# Patient Record
Sex: FEMALE | Race: WHITE | Employment: FULL TIME | URBAN - METROPOLITAN AREA
[De-identification: names, ages, dates, MRNs, and addresses within clinical notes are randomized per-mention and may not be internally consistent; named-entity substitution may affect disease eponyms.]

---

## 2018-02-05 ENCOUNTER — HOSPITAL ENCOUNTER (EMERGENCY)
Age: 23
Discharge: HOME | End: 2018-02-05
Attending: EMERGENCY MEDICINE
Payer: MEDICAID

## 2018-02-05 VITALS
BODY MASS INDEX: 19.43 KG/M2 | WEIGHT: 116.6 LBS | TEMPERATURE: 98.9 F | OXYGEN SATURATION: 100 % | HEART RATE: 108 BPM | DIASTOLIC BLOOD PRESSURE: 91 MMHG | SYSTOLIC BLOOD PRESSURE: 127 MMHG | RESPIRATION RATE: 16 BRPM | HEIGHT: 65 IN

## 2018-02-05 DIAGNOSIS — N76.2 CELLULITIS OF LABIA: Primary | ICD-10-CM

## 2018-02-05 DIAGNOSIS — R59.9 REACTIVE LYMPHADENOPATHY: ICD-10-CM

## 2018-02-05 LAB
CHP ED QC CHECK: NORMAL
PREGNANCY TEST URINE, POC: NEGATIVE

## 2018-02-05 PROCEDURE — 99283 EMERGENCY DEPT VISIT LOW MDM: CPT

## 2018-02-05 RX ORDER — CLONIDINE HYDROCHLORIDE 0.1 MG/1
0.1 TABLET ORAL NIGHTLY
COMMUNITY

## 2018-02-05 RX ORDER — DOXYCYCLINE HYCLATE 100 MG
100 TABLET ORAL 2 TIMES DAILY
Qty: 20 TABLET | Refills: 0 | Status: SHIPPED | OUTPATIENT
Start: 2018-02-05 | End: 2018-05-16 | Stop reason: ALTCHOICE

## 2018-02-05 RX ORDER — LAMOTRIGINE 200 MG/1
200 TABLET ORAL DAILY
COMMUNITY

## 2018-02-05 RX ORDER — CITALOPRAM 40 MG/1
40 TABLET ORAL DAILY
COMMUNITY

## 2018-02-05 RX ORDER — CEPHALEXIN 500 MG/1
500 CAPSULE ORAL 3 TIMES DAILY
Qty: 21 CAPSULE | Refills: 0 | Status: SHIPPED | OUTPATIENT
Start: 2018-02-05 | End: 2018-05-16 | Stop reason: ALTCHOICE

## 2018-02-05 ASSESSMENT — ENCOUNTER SYMPTOMS
CONSTIPATION: 0
WHEEZING: 0
NAUSEA: 0
VOMITING: 0
ABDOMINAL PAIN: 0
SORE THROAT: 0
COLOR CHANGE: 0
RHINORRHEA: 0
DIARRHEA: 0
COUGH: 0
SINUS PRESSURE: 0
SHORTNESS OF BREATH: 0

## 2018-02-05 ASSESSMENT — PAIN DESCRIPTION - FREQUENCY: FREQUENCY: CONTINUOUS

## 2018-02-05 ASSESSMENT — PAIN SCALES - GENERAL: PAINLEVEL_OUTOF10: 8

## 2018-02-05 ASSESSMENT — PAIN DESCRIPTION - LOCATION: LOCATION: GROIN

## 2018-02-05 ASSESSMENT — PAIN SCALES - WONG BAKER: WONGBAKER_NUMERICALRESPONSE: 8

## 2018-02-05 ASSESSMENT — PAIN DESCRIPTION - ORIENTATION: ORIENTATION: RIGHT;LEFT

## 2018-02-05 ASSESSMENT — PAIN DESCRIPTION - DESCRIPTORS: DESCRIPTORS: STABBING;THROBBING;SHOOTING;SHARP

## 2018-02-05 NOTE — ED NOTES
Swollen lymph nodes both groin past 4 days denies injury no sores/lacerations denies fever or chills.      Porsha Brandon RN  02/05/18 6043

## 2018-02-05 NOTE — ED PROVIDER NOTES
[address] [place] [address] ED  eMERGENCY dEPARTMENT eNCOUnter      Pt Name: Kareem Moscoso  MRN: [id number]  [place] 1995  Date of evaluation: 2/5/2018  Provider: [address] [place] [address] NP, OMARI    CHIEF COMPLAINT       Chief Complaint   Patient presents with    Groin Swelling     bilat. onset 4 days ago, history of overactive lymph nodes         HISTORY OF PRESENT ILLNESS  (Location/Symptom, Timing/Onset, Context/Setting, Quality, Duration, Modifying Factors, Severity.)   Kareem Moscoso is a 25 y.o. female who presents to the emergency department Today by private vehicle for evaluation of swelling to the labia. The patient states that for the last 4 days she had noted some swelling and tenderness to the right labia. She states the last time she had also noted some swollen lymph nodes in both of her groins. She states that she has had lymph nodes removed and swollen in her armpits in the past.  She had been placed on antibiotics at that time and the lymph nodes improved. She denies any associated fevers or chills. She denies any vaginal bleeding or vaginal discharge       Nursing Notes were reviewed. ALLERGIES     Review of patient's allergies indicates no known allergies.     CURRENT MEDICATIONS       Discharge Medication List as of 2/5/2018  2:17 PM      CONTINUE these medications which have NOT CHANGED    Details   ClonazePAM (KLONOPIN PO) Take by mouthHistorical Med      cloNIDine (CATAPRES) 0.1 MG tablet Take 0.1 mg by mouth nightlyHistorical Med      Melatonin-Pyridoxine (MELATONEX PO) Take 5 mg by mouth nightlyHistorical Med      citalopram (CELEXA) 40 MG tablet Take 40 mg by mouth dailyHistorical Med      NONFORMULARY Historical Med      lamoTRIgine (LAMICTAL) 200 MG tablet Take 200 mg by mouth dailyHistorical Med             PAST MEDICAL HISTORY         Diagnosis Date    Depression        SURGICAL HISTORY           Procedure Laterality Date    OVARIAN CYST REMOVAL           FAMILY HISTORY     History Lymphadenopathy:     She has no cervical adenopathy. Neurological: She is alert and oriented to person, place, and time. Skin: Skin is warm and dry. No rash noted. Psychiatric: She has a normal mood and affect. Vitals reviewed. LABS:  Labs Reviewed   POCT URINE PREGNANCY - Normal       All other labs were within normal range or not returned as of this dictation. EMERGENCY DEPARTMENT COURSE and DIFFERENTIAL DIAGNOSIS/MDM:   Vitals:    Vitals:    02/05/18 1315   BP: (!) 127/91   Pulse: 108   Resp: 16   Temp: 98.9 °F (37.2 °C)   TempSrc: Oral   SpO2: 100%   Weight: 116 lb 9.6 oz (52.9 kg)   Height: 5' 5\" (1.651 m)       Medical Decision Making: Patient was placed on doxycycline and Keflex. Follow up with her primary care physician if symptoms do not improve, return for worsening symptoms or concerns    FINAL IMPRESSION      1. Cellulitis of labia    2.  Reactive lymphadenopathy          DISPOSITION/PLAN   DISPOSITION Decision To Discharge 02/05/2018 02:16:13 PM      PATIENT REFERRED TO:   Elnoria Sicard, [address] [id number]    Call in 2 days      [facility] ED  [address] [facility]  [phone]    If symptoms worsen      DISCHARGE MEDICATIONS:     Discharge Medication List as of 2/5/2018  2:17 PM      START taking these medications    Details   doxycycline hyclate (VIBRA-TABS) 100 MG tablet Take 1 tablet by mouth 2 times daily, Disp-20 tablet, R-0Print      cephALEXin (KEFLEX) 500 MG capsule Take 1 capsule by mouth 3 times daily, Disp-21 capsule, R-0Print                 (Please note that portions of this note were completed with a voice recognition program.  Efforts were made to edit the dictations but occasionally words are mis-transcribed.)    Aneta Herring NP, CNP  Certified Nurse Practitioner            Melissa Garcia CNP  02/05/18 5823

## 2018-05-16 ENCOUNTER — OFFICE VISIT (OUTPATIENT)
Dept: FAMILY MEDICINE CLINIC | Age: 23
End: 2018-05-16

## 2018-05-16 VITALS
OXYGEN SATURATION: 98 % | TEMPERATURE: 98.5 F | RESPIRATION RATE: 16 BRPM | DIASTOLIC BLOOD PRESSURE: 80 MMHG | SYSTOLIC BLOOD PRESSURE: 104 MMHG | HEART RATE: 77 BPM

## 2018-05-16 DIAGNOSIS — Z02.1 PHYSICAL EXAM, PRE-EMPLOYMENT: Primary | ICD-10-CM

## 2018-05-16 PROCEDURE — WM9428: Performed by: NURSE PRACTITIONER

## 2018-05-16 ASSESSMENT — ENCOUNTER SYMPTOMS
CHEST TIGHTNESS: 0
WHEEZING: 0
SHORTNESS OF BREATH: 0
CONSTIPATION: 0
DIARRHEA: 0
EYES NEGATIVE: 1
GASTROINTESTINAL NEGATIVE: 1
BACK PAIN: 0
ABDOMINAL PAIN: 0
COUGH: 0

## 2020-08-17 ENCOUNTER — OFFICE VISIT (OUTPATIENT)
Dept: FAMILY MEDICINE CLINIC | Age: 25
End: 2020-08-17
Payer: COMMERCIAL

## 2020-08-17 VITALS
DIASTOLIC BLOOD PRESSURE: 81 MMHG | BODY MASS INDEX: 21.69 KG/M2 | HEART RATE: 97 BPM | WEIGHT: 135 LBS | SYSTOLIC BLOOD PRESSURE: 114 MMHG | OXYGEN SATURATION: 95 % | TEMPERATURE: 98.5 F | HEIGHT: 66 IN

## 2020-08-17 PROCEDURE — 99213 OFFICE O/P EST LOW 20 MIN: CPT | Performed by: NURSE PRACTITIONER

## 2020-08-17 RX ORDER — LAMOTRIGINE 25 MG/1
TABLET ORAL
COMMUNITY
Start: 2020-08-05

## 2020-08-17 RX ORDER — IBUPROFEN 800 MG/1
TABLET ORAL
COMMUNITY
Start: 2020-07-16

## 2020-08-17 RX ORDER — CEPHALEXIN 500 MG/1
500 CAPSULE ORAL 3 TIMES DAILY
Qty: 21 CAPSULE | Refills: 0 | Status: SHIPPED | OUTPATIENT
Start: 2020-08-17 | End: 2020-08-24

## 2020-08-17 ASSESSMENT — PATIENT HEALTH QUESTIONNAIRE - PHQ9
SUM OF ALL RESPONSES TO PHQ QUESTIONS 1-9: 0
2. FEELING DOWN, DEPRESSED OR HOPELESS: 0
1. LITTLE INTEREST OR PLEASURE IN DOING THINGS: 0
SUM OF ALL RESPONSES TO PHQ9 QUESTIONS 1 & 2: 0
SUM OF ALL RESPONSES TO PHQ QUESTIONS 1-9: 0

## 2020-08-17 ASSESSMENT — ENCOUNTER SYMPTOMS
COUGH: 0
VOMITING: 0
ABDOMINAL PAIN: 0
RESPIRATORY NEGATIVE: 1
SHORTNESS OF BREATH: 0
DIARRHEA: 0
CONSTIPATION: 0
NAUSEA: 0
WHEEZING: 0
CHEST TIGHTNESS: 0

## 2020-08-17 NOTE — PROGRESS NOTES
[address]  [address] [place] [address]  Dept: [phone]  Dept Fax: [phone]     Kevin Sharma is a 25 y.o. female who presents to the urgent care today for her medicalconditions/complaints as noted below. Kevin Sharma is c/o of Cyst (under lt arm - noticed it 4 days ago) and Menorrhagia (has been bleeding for the past 4 days after period stopped and felt a pop)    HPI:      Rash   This is a new problem. Episode onset: a few days ago. The problem is unchanged. Location: left axilla. The rash is characterized by redness and pain. She was exposed to nothing. Pertinent negatives include no cough, diarrhea, fatigue, fever, shortness of breath or vomiting. Past treatments include nothing. The treatment provided no relief. Vaginal Bleeding   The patient's primary symptoms include vaginal bleeding. The patient's pertinent negatives include no pelvic pain or vaginal discharge. This is a new problem. Episode onset: 4 days ago. The problem has been unchanged. Associated symptoms include rash. Pertinent negatives include no abdominal pain, chills, constipation, diarrhea, dysuria, fever, frequency, hematuria, nausea, painful intercourse, urgency or vomiting. She has tried nothing for the symptoms. The treatment provided no relief. Patient gave birth on July 3. Has not been bleeding since August 3rd. Started vaginal bleeding again 4 days ago. States that she felt like there was a pop and a gush. Reports that it has been heavy bleeding has been intermittent. Has not been sexually active since child birth.     Past Medical History:   Diagnosis Date    Depression       Current Outpatient Medications   Medication Sig Dispense Refill    lamoTRIgine (LAMICTAL) 25 MG tablet TAKE 1 TABLET BY MOUTH ONCE DAILY      ibuprofen (ADVIL;MOTRIN) 800 MG tablet TAKE 1 TABLET BY MOUTH EVERY 8 HOURS AS NEEDED FOR PAIN      Prenatal Vit-Fe Fumarate-FA (PRENATAL VITAMINS PO) Take 1 tablet by mouth daily      cephALEXin (KEFLEX) 500 MG capsule Take 1 capsule by mouth 3 times daily for 7 days 21 capsule 0    cloNIDine (CATAPRES) 0.1 MG tablet Take 0.1 mg by mouth nightly      Melatonin-Pyridoxine (MELATONEX PO) Take 5 mg by mouth nightly      citalopram (CELEXA) 40 MG tablet Take 40 mg by mouth daily      NONFORMULARY       lamoTRIgine (LAMICTAL) 200 MG tablet Take 200 mg by mouth daily       No current facility-administered medications for this visit. Allergies   Allergen Reactions    Pineapple Swelling     Reviewed PMH, SH, and  with the patient and updated. Subjective:      Review of Systems   Constitutional: Negative for chills, fatigue and fever. Respiratory: Negative. Negative for cough, chest tightness, shortness of breath and wheezing. Cardiovascular: Negative. Negative for chest pain. Gastrointestinal: Negative for abdominal pain, constipation, diarrhea, nausea and vomiting. Genitourinary: Positive for vaginal bleeding. Negative for dysuria, frequency, hematuria, pelvic pain, urgency, vaginal discharge and vaginal pain. Skin: Positive for rash. All other systems reviewed and are negative. Objective:      Physical Exam  Vitals signs and nursing note reviewed. Constitutional:       General: She is not in acute distress. Appearance: She is well-developed. She is not diaphoretic. HENT:      Head: Normocephalic and atraumatic. Cardiovascular:      Rate and Rhythm: Normal rate and regular rhythm. Heart sounds: Normal heart sounds. No murmur. Pulmonary:      Effort: Pulmonary effort is normal. No respiratory distress. Breath sounds: Normal breath sounds. No wheezing. Abdominal:      General: Abdomen is flat. Bowel sounds are normal. There is no distension. Tenderness: There is no abdominal tenderness. There is no right CVA tenderness or left CVA tenderness. Skin:     General: Skin is warm and dry. Findings: Abscess (indurated abscess, the size of a nickel, noted to the left axilla with slight erythema) present. Neurological:      Mental Status: She is alert. /81 (Site: Right Upper Arm, Position: Sitting, Cuff Size: Medium Adult)   Pulse 97   Temp 98.5 °F (36.9 °C) (Oral)   Ht 5' 5.5\" (1.664 m)   Wt 135 lb (61.2 kg)   SpO2 95%   BMI 22.12 kg/m²     Assessment:       Diagnosis Orders   1. Abscess of axilla, left  cephALEXin (KEFLEX) 500 MG capsule   2. Vaginal bleeding  US NON OB TRANSVAGINAL    US PELVIS COMPLETE   3. Pelvic pain  US NON OB TRANSVAGINAL    US PELVIS COMPLETE     Plan:      Abscess--  Patient instructed to complete antibiotic prescription fully. Warm compresses TID for 15 minutes at a time. Pelvic bleeding--  Will get transvaginal and pelvic ultrasound. Possible treatment alterations based on the results. Tylenol/Motrin as needed for the discomfort/  Head application to the pelvis as needed. Patient agreeable to treatment plan. Educational materials provided on AVS.  Follow up if symptoms do not improve/worsen. Orders Placed This Encounter   Medications    cephALEXin (KEFLEX) 500 MG capsule     Sig: Take 1 capsule by mouth 3 times daily for 7 days     Dispense:  21 capsule     Refill:  0        Patient given educational materials - see patient instructions. Discussed use, benefit, and side effects of prescribed medications. All patientquestions answered. Pt voiced understanding.     Electronically signed by RAUL Park CNP on 8/17/2020at 2:52 PM

## 2020-08-17 NOTE — PATIENT INSTRUCTIONS
closely for changes in your health, and be sure to contact your doctor if:  · You do not get better as expected. Where can you learn more? Go to https://chpepiceweb.Create. org and sign in to your [employer] account. Enter [phone] in the [facility] box to learn more about \"Pelvic Pain: Care Instructions. \"     If you do not have an account, please click on the \"Sign Up Now\" link. Current as of: November 8, 2019               Content Version: 12.5  © 2575-4716 [employer]. Care instructions adapted under license by [facility] ([facility]). If you have questions about a medical condition or this instruction, always ask your healthcare professional. [address] any warranty or liability for your use of this information. Patient Education        Skin Abscess: Care Instructions  Your Care Instructions     A skin abscess is a bacterial infection that forms a pocket of pus. A boil is a kind of skin abscess. The doctor may have cut an opening in the abscess so that the pus can drain out. You may have gauze in the cut so that the abscess will stay open and keep draining. You may need antibiotics. You will need to follow up with your doctor to make sure the infection has gone away. The doctor has checked you carefully, but problems can develop later. If you notice any problems or new symptoms, get medical treatment right away. Follow-up care is a key part of your treatment and safety. Be sure to make and go to all appointments, and call your doctor if you are having problems. It's also a good idea to know your test results and keep a list of the medicines you take. How can you care for yourself at home? · Apply warm and dry compresses, a heating pad set on low, or a hot water bottle 3 or 4 times a day for pain. Keep a cloth between the heat source and your skin. · If your doctor prescribed antibiotics, take them as directed.  Do not stop taking them just because you feel better. You need to take the full course of antibiotics. · Take pain medicines exactly as directed. ? If the doctor gave you a prescription medicine for pain, take it as prescribed. ? If you are not taking a prescription pain medicine, ask your doctor if you can take an over-the-counter medicine. · Keep your bandage clean and dry. Change the bandage whenever it gets wet or dirty, or at least one time a day. · If the abscess was packed with gauze:  ? Keep follow-up appointments to have the gauze changed or removed. If the doctor instructed you to remove the gauze, follow the instructions you were given for how to remove it. ? After the gauze is removed, soak the area in warm water for 15 to 20 minutes 2 times a day, until the wound closes. When should you call for help? Call your doctor now or seek immediate medical care if:  · You have signs of worsening infection, such as:  ? Increased pain, swelling, warmth, or redness. ? Red streaks leading from the infected skin. ? Pus draining from the wound. ? A fever. Watch closely for changes in your health, and be sure to contact your doctor if:  · You do not get better as expected. Where can you learn more? Go to https://Qianxs.com.TEXbase. org and sign in to your [employer] account. Enter F423 in the [facility] box to learn more about \"Skin Abscess: Care Instructions. \"     If you do not have an account, please click on the \"Sign Up Now\" link. Current as of: October 31, 2019               Content Version: 12.5  © 0109-2878 [employer]. Care instructions adapted under license by [facility] ([facility]). If you have questions about a medical condition or this instruction, always ask your healthcare professional. [place] [address] any warranty or liability for your use of this information.

## 2020-08-19 ENCOUNTER — HOSPITAL ENCOUNTER (OUTPATIENT)
Dept: ULTRASOUND IMAGING | Age: 25
Discharge: HOME | End: 2020-08-21
Payer: COMMERCIAL

## 2020-08-19 PROCEDURE — 76830 TRANSVAGINAL US NON-OB: CPT

## 2020-08-19 PROCEDURE — 76856 US EXAM PELVIC COMPLETE: CPT
